# Patient Record
Sex: MALE | URBAN - METROPOLITAN AREA
[De-identification: names, ages, dates, MRNs, and addresses within clinical notes are randomized per-mention and may not be internally consistent; named-entity substitution may affect disease eponyms.]

---

## 2018-10-10 ENCOUNTER — OFFICE VISIT (OUTPATIENT)
Dept: FAMILY MEDICINE CLINIC | Facility: CLINIC | Age: 28
End: 2018-10-10
Payer: COMMERCIAL

## 2018-10-10 VITALS — BODY MASS INDEX: 25.2 KG/M2 | HEIGHT: 71 IN | WEIGHT: 180 LBS

## 2018-10-10 DIAGNOSIS — Z20.2 STD EXPOSURE: Primary | ICD-10-CM

## 2018-10-10 PROCEDURE — 87086 URINE CULTURE/COLONY COUNT: CPT | Performed by: NURSE PRACTITIONER

## 2018-10-10 PROCEDURE — 99202 OFFICE O/P NEW SF 15 MIN: CPT | Performed by: NURSE PRACTITIONER

## 2018-10-10 PROCEDURE — 81003 URINALYSIS AUTO W/O SCOPE: CPT | Performed by: NURSE PRACTITIONER

## 2018-10-10 RX ORDER — AZITHROMYCIN 500 MG/1
TABLET, FILM COATED ORAL
Qty: 2 TABLET | Refills: 0 | Status: SHIPPED | OUTPATIENT
Start: 2018-10-10

## 2018-10-10 NOTE — PATIENT INSTRUCTIONS
Chlamydia Urethritis, Treated (Male)  You have an infection in the channel in the penis that carries urine (the urethra). The infection is caused by the bacteria chlamydia. This infection is a sexually transmitted disease (STD). It is highly contagious. Follow up with your health care provider, or as advised. If a culture test was taken, you may call as advised for the results. You should have another culture test 4 to 6 weeks after treatment. This is to make sure the infection is gone.  Call your provider Gonorrhea is treated with antibiotics. A one-time dose generally cures it in both men and women.   Why do I need this test?  You may need this test if you have risk factors for gonorrhea.  And you may have it if your healthcare provider thinks that you have This test poses no known risks. What might affect my test results? Urinating within 1 to 2 hours of collecting the urine sample may affect the results.  Taking antibiotics can also affect your test results. If you are female, your test results could be af

## 2018-10-11 ENCOUNTER — TELEPHONE (OUTPATIENT)
Dept: FAMILY MEDICINE CLINIC | Facility: CLINIC | Age: 28
End: 2018-10-11

## 2018-10-11 DIAGNOSIS — Z20.2 STD EXPOSURE: Primary | ICD-10-CM

## 2018-10-11 NOTE — TELEPHONE ENCOUNTER
Per lab, the collection for Gc/chlamydia was not able to be tested and a recollection is needed. Provider at time of visit and ordering provider LILLIAN Pearson NP notified of lab message. Patient notified that another specimen needs to be collected.  Patient sta

## 2018-10-11 NOTE — PROGRESS NOTES
CHIEF COMPLAINT:   Patient presents with:  Sexually Transmitted      HPI:   Ruben Aguila is a 25year old male who concerns of STD. Recent female partner was diagnosed with Chlamydia.   Patient reports he has experienced mild penial discharge, cl Multistix Lot# [id number] Numeric    Multistix Expiration Date 06/30/19 Date     Urine sample was first void dirty catch for GC test.    ASSESSMENT AND PLAN:   Jamaica Shook is a 25year old male presents with UTI symptoms.     ASSESSMENT:  Std exposu Follow these guidelines when caring for yourself at home:  · Your sexual partner needs to be treated even if he or she has no symptoms. Your partner should contact his or her own healthcare provider.  Or your partner can go to an urgent care clinic or your © 2083-2617 [address] [facility], [address] [place] [place]. All rights reserved. This information is not intended as a substitute for professional medical care. Always follow your healthcare professional's instructions.         Marko Goldman In both men and women, symptoms of anal gonorrhea include:  · Anal itching  · Soreness  · Bleeding   · Painful bowel movements   If you're pregnant, you may have this test as part of prenatal testing.  A pregnant woman can pass the infection to her baby dur © 3158-5540 [address]. [address] [facility], [facility] [place]. All rights reserved. This information is not intended as a substitute for professional medical care. Always follow your healthcare professional's instructions.

## 2018-10-16 ENCOUNTER — TELEPHONE (OUTPATIENT)
Dept: FAMILY MEDICINE CLINIC | Facility: CLINIC | Age: 28
End: 2018-10-16

## 2018-10-16 NOTE — TELEPHONE ENCOUNTER
Left message about needing specimen sample for ordered test.  Patient to call [phone] for any questions/concerns.

## 2018-10-20 ENCOUNTER — TELEPHONE (OUTPATIENT)
Dept: FAMILY MEDICINE CLINIC | Facility: CLINIC | Age: 28
End: 2018-10-20

## 2018-10-20 NOTE — TELEPHONE ENCOUNTER
Per CAROLINA/RAFAEL on file left WNL urine cultrure results on patient's voicemail. Instructed patient to call [phone] or [phone] at next opportunity regarding other lab issues.

## 2018-10-20 NOTE — TELEPHONE ENCOUNTER
Spoke with patient, he reports his symptoms have resolved. Informed him he still needs to give a sample so we can test/treat as needed for gonorrhea. Also informed he needs recheck at 4 weeks, reiterated previous treatment plan/instructions.  Patient agreed

## 2018-10-20 NOTE — TELEPHONE ENCOUNTER
Per FYI/HIPPA left voicemail on patient's mother's phone, asking her to inform him to call clinic at [phone] at his next opportunity. No information about why he needed to call clinic was left on mother's voicemail.

## 2018-10-23 PROCEDURE — 87591 N.GONORRHOEAE DNA AMP PROB: CPT | Performed by: NURSE PRACTITIONER

## 2018-10-23 PROCEDURE — 87491 CHLMYD TRACH DNA AMP PROBE: CPT | Performed by: NURSE PRACTITIONER
